# Patient Record
Sex: MALE | Race: WHITE | NOT HISPANIC OR LATINO | Employment: FULL TIME | URBAN - METROPOLITAN AREA
[De-identification: names, ages, dates, MRNs, and addresses within clinical notes are randomized per-mention and may not be internally consistent; named-entity substitution may affect disease eponyms.]

---
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
## 2023-01-23 ENCOUNTER — OFFICE VISIT (OUTPATIENT)
Dept: SPORTS MEDICINE | Facility: CLINIC | Age: 18
End: 2023-01-23

## 2023-01-23 VITALS
WEIGHT: 154 LBS | DIASTOLIC BLOOD PRESSURE: 80 MMHG | SYSTOLIC BLOOD PRESSURE: 120 MMHG | HEART RATE: 78 BPM | HEIGHT: 71 IN | BODY MASS INDEX: 21.56 KG/M2 | OXYGEN SATURATION: 98 %

## 2023-01-23 DIAGNOSIS — Z00.00 ANNUAL PHYSICAL EXAM: Primary | ICD-10-CM

## 2023-01-23 LAB
25(OH)D3 SERPL-MCNC: 37.1 NG/ML (ref 30–100)
ALBUMIN SERPL-MCNC: 5.1 G/DL (ref 3.5–5.2)
ALBUMIN/GLOB SERPL: 2.2 G/DL
ALP SERPL-CCNC: 122 U/L (ref 39–117)
ALT SERPL W P-5'-P-CCNC: 19 U/L (ref 1–41)
ANION GAP SERPL CALCULATED.3IONS-SCNC: 11.6 MMOL/L (ref 5–15)
AST SERPL-CCNC: 24 U/L (ref 1–40)
BASOPHILS # BLD AUTO: 0.05 10*3/MM3 (ref 0–0.2)
BASOPHILS NFR BLD AUTO: 0.8 % (ref 0–1.5)
BILIRUB SERPL-MCNC: 0.6 MG/DL (ref 0–1.2)
BUN SERPL-MCNC: 21 MG/DL (ref 6–20)
BUN/CREAT SERPL: 19.6 (ref 7–25)
CALCIUM SPEC-SCNC: 10.4 MG/DL (ref 8.6–10.5)
CHLORIDE SERPL-SCNC: 100 MMOL/L (ref 98–107)
CHOLEST SERPL-MCNC: 153 MG/DL (ref 0–200)
CO2 SERPL-SCNC: 27.4 MMOL/L (ref 22–29)
CREAT SERPL-MCNC: 1.07 MG/DL (ref 0.76–1.27)
DEPRECATED RDW RBC AUTO: 40.8 FL (ref 37–54)
EGFRCR SERPLBLD CKD-EPI 2021: 102.5 ML/MIN/1.73
EOSINOPHIL # BLD AUTO: 0.08 10*3/MM3 (ref 0–0.4)
EOSINOPHIL NFR BLD AUTO: 1.2 % (ref 0.3–6.2)
ERYTHROCYTE [DISTWIDTH] IN BLOOD BY AUTOMATED COUNT: 12.4 % (ref 12.3–15.4)
FERRITIN SERPL-MCNC: 133 NG/ML (ref 30–400)
GLOBULIN UR ELPH-MCNC: 2.3 GM/DL
GLUCOSE SERPL-MCNC: 61 MG/DL (ref 65–99)
HCT VFR BLD AUTO: 47 % (ref 37.5–51)
HDLC SERPL QL: 2.94
HDLC SERPL-MCNC: 52 MG/DL (ref 40–60)
HGB BLD-MCNC: 15.8 G/DL (ref 13–17.7)
IMM GRANULOCYTES # BLD AUTO: 0.02 10*3/MM3 (ref 0–0.05)
IMM GRANULOCYTES NFR BLD AUTO: 0.3 % (ref 0–0.5)
IRON 24H UR-MRATE: 139 MCG/DL (ref 59–158)
IRON SATN MFR SERPL: 37 % (ref 20–50)
LDLC SERPL CALC-MCNC: 85 MG/DL (ref 0–100)
LYMPHOCYTES # BLD AUTO: 2.41 10*3/MM3 (ref 0.7–3.1)
LYMPHOCYTES NFR BLD AUTO: 36.2 % (ref 19.6–45.3)
MCH RBC QN AUTO: 29.9 PG (ref 26.6–33)
MCHC RBC AUTO-ENTMCNC: 33.6 G/DL (ref 31.5–35.7)
MCV RBC AUTO: 89 FL (ref 79–97)
MONOCYTES # BLD AUTO: 0.33 10*3/MM3 (ref 0.1–0.9)
MONOCYTES NFR BLD AUTO: 5 % (ref 5–12)
NEUTROPHILS NFR BLD AUTO: 3.77 10*3/MM3 (ref 1.7–7)
NEUTROPHILS NFR BLD AUTO: 56.5 % (ref 42.7–76)
NRBC BLD AUTO-RTO: 0 /100 WBC (ref 0–0.2)
PLATELET # BLD AUTO: 250 10*3/MM3 (ref 140–450)
PMV BLD AUTO: 11.4 FL (ref 6–12)
POTASSIUM SERPL-SCNC: 4.3 MMOL/L (ref 3.5–5.2)
PROT SERPL-MCNC: 7.4 G/DL (ref 6–8.5)
RBC # BLD AUTO: 5.28 10*6/MM3 (ref 4.14–5.8)
SODIUM SERPL-SCNC: 139 MMOL/L (ref 136–145)
TIBC SERPL-MCNC: 375 MCG/DL (ref 298–536)
TRANSFERRIN SERPL-MCNC: 252 MG/DL (ref 200–360)
TRIGL SERPL-MCNC: 84 MG/DL (ref 0–150)
TSH SERPL DL<=0.05 MIU/L-ACNC: 5.73 UIU/ML (ref 0.27–4.2)
VLDLC SERPL-MCNC: 16 MG/DL (ref 5–40)
WBC NRBC COR # BLD: 6.66 10*3/MM3 (ref 3.4–10.8)

## 2023-01-23 PROCEDURE — 84466 ASSAY OF TRANSFERRIN: CPT | Performed by: FAMILY MEDICINE

## 2023-01-23 PROCEDURE — 82728 ASSAY OF FERRITIN: CPT | Performed by: FAMILY MEDICINE

## 2023-01-23 PROCEDURE — LCFCNOCHG: Performed by: FAMILY MEDICINE

## 2023-01-23 PROCEDURE — 80061 LIPID PANEL: CPT | Performed by: FAMILY MEDICINE

## 2023-01-23 PROCEDURE — 80053 COMPREHEN METABOLIC PANEL: CPT | Performed by: FAMILY MEDICINE

## 2023-01-23 PROCEDURE — 36415 COLL VENOUS BLD VENIPUNCTURE: CPT | Performed by: FAMILY MEDICINE

## 2023-01-23 PROCEDURE — 83540 ASSAY OF IRON: CPT | Performed by: FAMILY MEDICINE

## 2023-01-23 PROCEDURE — 85025 COMPLETE CBC W/AUTO DIFF WBC: CPT | Performed by: FAMILY MEDICINE

## 2023-01-23 PROCEDURE — 84443 ASSAY THYROID STIM HORMONE: CPT | Performed by: FAMILY MEDICINE

## 2023-01-23 PROCEDURE — 85660 RBC SICKLE CELL TEST: CPT | Performed by: FAMILY MEDICINE

## 2023-01-23 PROCEDURE — 82306 VITAMIN D 25 HYDROXY: CPT | Performed by: FAMILY MEDICINE

## 2023-01-23 NOTE — PROGRESS NOTES
"Blake is a 17 y.o. year old male presents to [facility]    Chief Complaint   Patient presents with   • Sports Physical       History of Present Illness  Pre-season CPE. Played with [employer] in [place]. No current c/o. See scanned info.    I have reviewed the patient's medical, family, and social history in detail and updated the computerized patient record.    /80 (BP Location: Right arm, Patient Position: Sitting, Cuff Size: Adult)   Pulse 78   Ht 180.3 cm (71\")   Wt 69.9 kg (154 lb)   SpO2 98%   BMI 21.48 kg/m²      Physical Exam    Mask worn thru encounter  Vital signs reviewed.   General: No acute distress.  Eyes: conjunctiva clear; pupils equally round and reactive  ENT: external ears atraumatic  CV: no peripheral edema  Resp: normal respiratory effort, no use of accessory muscles  Skin: no rashes or wounds; normal turgor  Psych: mood and affect appropriate; recent and remote memory intact  Neuro: sensation to light touch intact               Diagnoses and all orders for this visit:    Annual physical exam  -     Iron Profile  -     Ferritin  -     CBC & Differential  -     Comprehensive Metabolic Panel  -     Lipid Panel With / Chol / HDL Ratio  -     Vitamin D,25-Hydroxy  -     Sickle Cell Screen  -     Cancel: Thyroid Elma Profile  -     TSH          Follow Up   No follow-ups on file.  Patient was given instructions and counseling regarding his condition or for health maintenance advice. Please see specific information pulled into the AVS if appropriate.     EMR Dragon/Transcription disclaimer:    Much of this encounter note is an electronic transcription/translation of spoken language to printed text.  The electronic translation of spoken language may permit erroneous, or at times, nonsensical words or phrases to be inadvertently transcribed.  Although I have reviewed the note for such errors some may still exist.   "

## 2023-01-25 ENCOUNTER — TELEPHONE (OUTPATIENT)
Dept: SPORTS MEDICINE | Facility: CLINIC | Age: 18
End: 2023-01-25

## 2023-01-25 DIAGNOSIS — R79.89 ABNORMAL TSH: Primary | ICD-10-CM

## 2023-01-25 LAB — HGB S BLD QL SOLY: NEGATIVE

## 2023-01-25 NOTE — TELEPHONE ENCOUNTER
Reviewed pre-season labs. Reported wt loss since Nov, approx 10# per our scale. TSH high, further thyroid w/up warranted. Remainder of labs wnl.    Office Visit on 01/23/2023   Component Date Value Ref Range Status   • Iron 01/23/2023 139  59 - 158 mcg/dL Final   • Iron Saturation 01/23/2023 37  20 - 50 % Final   • Transferrin 01/23/2023 252  200 - 360 mg/dL Final   • TIBC 01/23/2023 375  298 - 536 mcg/dL Final   • Ferritin 01/23/2023 133.00  30.00 - 400.00 ng/mL Final   • Glucose 01/23/2023 61 (L)  65 - 99 mg/dL Final   • BUN 01/23/2023 21 (H)  6 - 20 mg/dL Final   • Creatinine 01/23/2023 1.07  0.76 - 1.27 mg/dL Final   • Sodium 01/23/2023 139  136 - 145 mmol/L Final   • Potassium 01/23/2023 4.3  3.5 - 5.2 mmol/L Final   • Chloride 01/23/2023 100  98 - 107 mmol/L Final   • CO2 01/23/2023 27.4  22.0 - 29.0 mmol/L Final   • Calcium 01/23/2023 10.4  8.6 - 10.5 mg/dL Final   • Total Protein 01/23/2023 7.4  6.0 - 8.5 g/dL Final   • Albumin 01/23/2023 5.1  3.5 - 5.2 g/dL Final   • ALT (SGPT) 01/23/2023 19  1 - 41 U/L Final   • AST (SGOT) 01/23/2023 24  1 - 40 U/L Final   • Alkaline Phosphatase 01/23/2023 122 (H)  39 - 117 U/L Final   • Total Bilirubin 01/23/2023 0.6  0.0 - 1.2 mg/dL Final   • Globulin 01/23/2023 2.3  gm/dL Final   • A/G Ratio 01/23/2023 2.2  g/dL Final   • BUN/Creatinine Ratio 01/23/2023 19.6  7.0 - 25.0 Final   • Anion Gap 01/23/2023 11.6  5.0 - 15.0 mmol/L Final   • eGFR 01/23/2023 102.5  >60.0 mL/min/1.73 Final   • Total Cholesterol 01/23/2023 153  0 - 200 mg/dL Final   • Triglycerides 01/23/2023 84  0 - 150 mg/dL Final   • HDL Cholesterol 01/23/2023 52  40 - 60 mg/dL Final   • LDL Cholesterol  01/23/2023 85  0 - 100 mg/dL Final   • VLDL Cholesterol 01/23/2023 16  5 - 40 mg/dL Final   • Chol/HDL Ratio 01/23/2023 2.94   Final   • 25 Hydroxy, Vitamin D 01/23/2023 37.1  30.0 - 100.0 ng/ml Final   • Hemoglobin (Hgb) Solubility 01/23/2023 Negative  Negative Final    Since a variety of conditions and other  abnormal hemoglobins in  addition to Hemoglobin S may give false-positive results, positive  Hemoglobin Solubility tests should be confirmed by hemoglobin  fractionation testing.   • TSH 01/23/2023 5.730 (H)  0.270 - 4.200 uIU/mL Final   • WBC 01/23/2023 6.66  3.40 - 10.80 10*3/mm3 Final   • RBC 01/23/2023 5.28  4.14 - 5.80 10*6/mm3 Final   • Hemoglobin 01/23/2023 15.8  13.0 - 17.7 g/dL Final   • Hematocrit 01/23/2023 47.0  37.5 - 51.0 % Final   • MCV 01/23/2023 89.0  79.0 - 97.0 fL Final   • MCH 01/23/2023 29.9  26.6 - 33.0 pg Final   • MCHC 01/23/2023 33.6  31.5 - 35.7 g/dL Final   • RDW 01/23/2023 12.4  12.3 - 15.4 % Final   • RDW-SD 01/23/2023 40.8  37.0 - 54.0 fl Final   • MPV 01/23/2023 11.4  6.0 - 12.0 fL Final   • Platelets 01/23/2023 250  140 - 450 10*3/mm3 Final   • Neutrophil % 01/23/2023 56.5  42.7 - 76.0 % Final   • Lymphocyte % 01/23/2023 36.2  19.6 - 45.3 % Final   • Monocyte % 01/23/2023 5.0  5.0 - 12.0 % Final   • Eosinophil % 01/23/2023 1.2  0.3 - 6.2 % Final   • Basophil % 01/23/2023 0.8  0.0 - 1.5 % Final   • Immature Grans % 01/23/2023 0.3  0.0 - 0.5 % Final   • Neutrophils, Absolute 01/23/2023 3.77  1.70 - 7.00 10*3/mm3 Final   • Lymphocytes, Absolute 01/23/2023 2.41  0.70 - 3.10 10*3/mm3 Final   • Monocytes, Absolute 01/23/2023 0.33  0.10 - 0.90 10*3/mm3 Final   • Eosinophils, Absolute 01/23/2023 0.08  0.00 - 0.40 10*3/mm3 Final   • Basophils, Absolute 01/23/2023 0.05  0.00 - 0.20 10*3/mm3 Final   • Immature Grans, Absolute 01/23/2023 0.02  0.00 - 0.05 10*3/mm3 Final   • nRBC 01/23/2023 0.0  0.0 - 0.2 /100 WBC Final

## 2023-02-24 ENCOUNTER — LAB (OUTPATIENT)
Dept: LAB | Facility: HOSPITAL | Age: 18
End: 2023-02-24

## 2023-02-24 PROCEDURE — 84443 ASSAY THYROID STIM HORMONE: CPT | Performed by: FAMILY MEDICINE

## 2023-05-01 ENCOUNTER — OFFICE VISIT (OUTPATIENT)
Dept: SPORTS MEDICINE | Facility: CLINIC | Age: 18
End: 2023-05-01

## 2023-05-01 VITALS
DIASTOLIC BLOOD PRESSURE: 70 MMHG | HEIGHT: 71 IN | SYSTOLIC BLOOD PRESSURE: 108 MMHG | WEIGHT: 167 LBS | HEART RATE: 67 BPM | BODY MASS INDEX: 23.38 KG/M2 | TEMPERATURE: 98.7 F | OXYGEN SATURATION: 99 %

## 2023-05-01 DIAGNOSIS — S93.422A SPRAIN OF DELTOID LIGAMENT OF LEFT ANKLE, INITIAL ENCOUNTER: Primary | ICD-10-CM

## 2023-05-01 NOTE — PROGRESS NOTES
"Blake is a 17 y.o. year old male     Chief Complaint   Patient presents with   • Ankle Pain     LEFT ANKLE- Patient states that he believes he injured his ankle during practice on Friday. Here for further evaluation        History of Present Illness  HPI   Here today for acute injury to the left ankle.  That occurred acutely while training on Thursday last week without any specific trauma, just plain in the foot.  Pain is persistent in the medial ankle just distal to the tibia.  Better with ice and rest.      Review of Systems    /70 (BP Location: Left arm, Patient Position: Sitting, Cuff Size: Adult)   Pulse 67   Temp 98.7 °F (37.1 °C) (Temporal)   Ht 180.3 cm (71\")   Wt 75.8 kg (167 lb)   SpO2 99%   BMI 23.29 kg/m²      Physical Exam    Vital signs reviewed.   General: No acute distress.      MSK Exam:  Ortho Exam  Left ankle: Normal appearance.  There is tenderness palpation on the deltoid ligament centrally, at the distal insertion on the medial talus and sustentaculum dana.  There is bony tenderness in this location.  There is normal range of motion.  There is no ligamentous laxity but there is pain with eversion talar tilt.  Normal tendon function without pain.    Left Ankle X-Ray  Indication: Pain  Views: AP, Lateral, Mortise    Findings:  No fracture  No bony lesion  Soft tissues normal  Normal joint spaces    No prior studies available for comparison.        Diagnoses and all orders for this visit:    Sprain of deltoid ligament of left ankle, initial encounter    Appears okay to treat as a sprain/contusion.  Discussed conservative measures, add some NSAIDs.      EMR Dragon/Transcription disclaimer:    Much of this encounter note is an electronic transcription/translation of spoken language to printed text.  The electronic translation of spoken language may permit erroneous, or at times, nonsensical words or phrases to be inadvertently transcribed.  Although I have reviewed the note for such " errors some may still exist.

## 2023-08-10 ENCOUNTER — OFFICE VISIT (OUTPATIENT)
Dept: SPORTS MEDICINE | Facility: CLINIC | Age: 18
End: 2023-08-10

## 2023-08-10 VITALS
TEMPERATURE: 98 F | WEIGHT: 160 LBS | OXYGEN SATURATION: 98 % | SYSTOLIC BLOOD PRESSURE: 106 MMHG | BODY MASS INDEX: 22.4 KG/M2 | HEIGHT: 71 IN | HEART RATE: 56 BPM | RESPIRATION RATE: 12 BRPM | DIASTOLIC BLOOD PRESSURE: 64 MMHG

## 2023-08-10 DIAGNOSIS — S82.892A AVULSION FRACTURE OF ANKLE, LEFT, CLOSED, INITIAL ENCOUNTER: ICD-10-CM

## 2023-08-10 DIAGNOSIS — M25.572 ACUTE LEFT ANKLE PAIN: Primary | ICD-10-CM

## 2023-08-10 NOTE — PROGRESS NOTES
"Blake is a 17 y.o. year old male presents to [facility]    Chief Complaint   Patient presents with    Left Ankle - Pain     Here for new x-rays of the LT ankle. Soccer ball hit the inner side of left ankle at a weird position which happened about 3 weeks ago (July 15th).  Pain has gotten a little better this week       History of Present Illness  LCFC.  Injured left ankle approximately 4 weeks ago.  Inversion injury.  He did associate swelling at that time though this has been slowly improving.  He did have some difficulty bearing weight for the first week though no longer.  He has noticed improvement in swelling over the past week with help of dry needling.    I have reviewed the patient's medical, family, and social history in detail and updated the computerized patient record.    /64 (BP Location: Right arm, Patient Position: Sitting, Cuff Size: Adult)   Pulse 56   Temp 98 øF (36.7 øC) (Infrared)   Resp 12   Ht 180.3 cm (71\")   Wt 72.6 kg (160 lb)   SpO2 98%   BMI 22.32 kg/mý      Physical Exam    Vital signs reviewed.   General: No acute distress.  Eyes: conjunctiva clear; pupils equally round and reactive  ENT: external ears atraumatic  CV: no peripheral edema  Resp: normal respiratory effort, no use of accessory muscles  Skin: no rashes or wounds; normal turgor  Psych: mood and affect appropriate; recent and remote memory intact  Neuro: sensation to light touch intact    MSK Exam  L ankle, foot: Negative anterior drawer.  Full range of motion the ankle.  There is bony tenderness along the posterior talus.    Left Ankle X-Ray  Indication: Pain  Views: AP, Lateral, Mortise    Findings:  Avulsion fracture of the posterior talus, age-indeterminate  No bony lesion  Soft tissues normal  Normal joint spaces    No prior studies available for comparison.             Diagnoses and all orders for this visit:    Acute left ankle pain    Avulsion fracture of ankle, left, " closed, initial encounter      Discussed x-ray findings with Blake.  Clinically he is improving.  He will continue to advance as tolerated.  No indication for further imaging at this time.      Follow Up   No follow-ups on file.  Patient was given instructions and counseling regarding his condition or for health maintenance advice. Please see specific information pulled into the AVS if appropriate.     EMR Dragon/Transcription disclaimer:    Much of this encounter note is an electronic transcription/translation of spoken language to printed text.  The electronic translation of spoken language may permit erroneous, or at times, nonsensical words or phrases to be inadvertently transcribed.  Although I have reviewed the note for such errors some may still exist.